# Patient Record
Sex: MALE | Race: WHITE | NOT HISPANIC OR LATINO | Employment: UNEMPLOYED | URBAN - METROPOLITAN AREA
[De-identification: names, ages, dates, MRNs, and addresses within clinical notes are randomized per-mention and may not be internally consistent; named-entity substitution may affect disease eponyms.]

---

## 2018-04-13 ENCOUNTER — TRANSCRIBE ORDERS (OUTPATIENT)
Dept: LAB | Facility: HOSPITAL | Age: 23
End: 2018-04-13

## 2018-04-13 ENCOUNTER — LAB (OUTPATIENT)
Dept: LAB | Facility: HOSPITAL | Age: 23
End: 2018-04-13
Attending: ORTHOPAEDIC SURGERY

## 2018-04-13 DIAGNOSIS — M25.373 ANKLE INSTABILITY, UNSPECIFIED LATERALITY: Primary | ICD-10-CM

## 2018-04-13 DIAGNOSIS — M25.373 ANKLE INSTABILITY, UNSPECIFIED LATERALITY: ICD-10-CM

## 2018-04-13 LAB
ALBUMIN SERPL-MCNC: 4.5 G/DL (ref 3.5–5.2)
ALBUMIN/GLOB SERPL: 1.8 G/DL
ALP SERPL-CCNC: 44 U/L (ref 39–117)
ALT SERPL W P-5'-P-CCNC: 21 U/L (ref 1–41)
ANION GAP SERPL CALCULATED.3IONS-SCNC: 11.4 MMOL/L
AST SERPL-CCNC: 17 U/L (ref 1–40)
BASOPHILS # BLD AUTO: 0.01 10*3/MM3 (ref 0–0.2)
BASOPHILS NFR BLD AUTO: 0.2 % (ref 0–1.5)
BILIRUB SERPL-MCNC: 0.4 MG/DL (ref 0.1–1.2)
BUN BLD-MCNC: 22 MG/DL (ref 6–20)
BUN/CREAT SERPL: 21.4 (ref 7–25)
CALCIUM SPEC-SCNC: 9.4 MG/DL (ref 8.6–10.5)
CHLORIDE SERPL-SCNC: 105 MMOL/L (ref 98–107)
CO2 SERPL-SCNC: 26.6 MMOL/L (ref 22–29)
CREAT BLD-MCNC: 1.03 MG/DL (ref 0.76–1.27)
DEPRECATED RDW RBC AUTO: 41.3 FL (ref 37–54)
EOSINOPHIL # BLD AUTO: 0.1 10*3/MM3 (ref 0–0.7)
EOSINOPHIL NFR BLD AUTO: 2 % (ref 0.3–6.2)
ERYTHROCYTE [DISTWIDTH] IN BLOOD BY AUTOMATED COUNT: 12.1 % (ref 11.5–14.5)
GFR SERPL CREATININE-BSD FRML MDRD: 89 ML/MIN/1.73
GLOBULIN UR ELPH-MCNC: 2.5 GM/DL
GLUCOSE BLD-MCNC: 108 MG/DL (ref 65–99)
HCT VFR BLD AUTO: 45.7 % (ref 40.4–52.2)
HGB BLD-MCNC: 15.6 G/DL (ref 13.7–17.6)
IMM GRANULOCYTES # BLD: 0 10*3/MM3 (ref 0–0.03)
IMM GRANULOCYTES NFR BLD: 0 % (ref 0–0.5)
LYMPHOCYTES # BLD AUTO: 1.46 10*3/MM3 (ref 0.9–4.8)
LYMPHOCYTES NFR BLD AUTO: 29.6 % (ref 19.6–45.3)
MCH RBC QN AUTO: 32.2 PG (ref 27–32.7)
MCHC RBC AUTO-ENTMCNC: 34.1 G/DL (ref 32.6–36.4)
MCV RBC AUTO: 94.4 FL (ref 79.8–96.2)
MONOCYTES # BLD AUTO: 0.55 10*3/MM3 (ref 0.2–1.2)
MONOCYTES NFR BLD AUTO: 11.2 % (ref 5–12)
NEUTROPHILS # BLD AUTO: 2.81 10*3/MM3 (ref 1.9–8.1)
NEUTROPHILS NFR BLD AUTO: 57 % (ref 42.7–76)
PLATELET # BLD AUTO: 198 10*3/MM3 (ref 140–500)
PMV BLD AUTO: 12 FL (ref 6–12)
POTASSIUM BLD-SCNC: 4.3 MMOL/L (ref 3.5–5.2)
PROT SERPL-MCNC: 7 G/DL (ref 6–8.5)
RBC # BLD AUTO: 4.84 10*6/MM3 (ref 4.6–6)
SODIUM BLD-SCNC: 143 MMOL/L (ref 136–145)
WBC NRBC COR # BLD: 4.93 10*3/MM3 (ref 4.5–10.7)

## 2018-04-13 PROCEDURE — 80053 COMPREHEN METABOLIC PANEL: CPT

## 2018-04-13 PROCEDURE — 36415 COLL VENOUS BLD VENIPUNCTURE: CPT

## 2018-04-13 PROCEDURE — 85025 COMPLETE CBC W/AUTO DIFF WBC: CPT

## 2020-01-06 ENCOUNTER — OFFICE VISIT (OUTPATIENT)
Dept: INTERNAL MEDICINE | Facility: CLINIC | Age: 25
End: 2020-01-06

## 2020-01-06 VITALS
HEART RATE: 86 BPM | TEMPERATURE: 98 F | OXYGEN SATURATION: 97 % | RESPIRATION RATE: 16 BRPM | DIASTOLIC BLOOD PRESSURE: 72 MMHG | WEIGHT: 192 LBS | SYSTOLIC BLOOD PRESSURE: 127 MMHG | HEIGHT: 73 IN | BODY MASS INDEX: 25.45 KG/M2

## 2020-01-06 DIAGNOSIS — Z00.00 ANNUAL PHYSICAL EXAM: Primary | ICD-10-CM

## 2020-01-06 DIAGNOSIS — Z20.2 POSSIBLE EXPOSURE TO STD: ICD-10-CM

## 2020-01-06 PROCEDURE — 99385 PREV VISIT NEW AGE 18-39: CPT | Performed by: NURSE PRACTITIONER

## 2020-01-06 NOTE — PROGRESS NOTES
Subjective   Landon Ambrose is a 24 y.o. male. Patient is here today for   Chief Complaint   Patient presents with   • Annual Exam   • Establish Care          Vitals:    01/06/20 0805   BP: 127/72   Pulse: 86   Resp: 16   Temp: 98 °F (36.7 °C)   SpO2: 97%     Body mass index is 25.33 kg/m².    The following portions of the patient's history were reviewed and updated as appropriate: allergies, current medications, past family history, past medical history, past social history, past surgical history and problem list.    History reviewed. No pertinent past medical history.   Allergies   Allergen Reactions   • Codeine Anaphylaxis     Stomach pain      Social History     Socioeconomic History   • Marital status: Single     Spouse name: Not on file   • Number of children: Not on file   • Years of education: Not on file   • Highest education level: Not on file   Tobacco Use   • Smoking status: Never Smoker   • Smokeless tobacco: Never Used   Substance and Sexual Activity   • Alcohol use: Yes     Frequency: 2-4 times a month     Comment: rarely   • Drug use: Yes     Types: Marijuana   • Sexual activity: Yes     Partners: Female     Birth control/protection: None      No current outpatient medications on file.       Objective   History of Present Illness   Landon Ambrose 24 y.o. male who presents for an Annual Wellness Visit and to establish care.  he has a history of There is no problem list on file for this patient.  .  he has been doing well with no interval problems.    Plan to update vaccines if needed today.    Health Habits:  Dental Exam. up to date  Eye Exam. up to date  Exercise: 0 times/week.  Current exercise activities include: works construction      Lab Results (most recent)     None            Review of Systems   Constitutional: Negative for fatigue.   Eyes: Negative for visual disturbance.   Respiratory: Positive for cough (was diagnosed with the flu a few weeks ago, has had a lingering cough , nonproductive  and is slowly improving ). Negative for chest tightness and shortness of breath.    Cardiovascular: Negative.    Gastrointestinal: Negative.    Genitourinary: Negative for discharge, dysuria, frequency, hematuria, penile pain, penile swelling, scrotal swelling and urgency.   Musculoskeletal: Negative.    Skin: Negative for rash.   Neurological: Negative.    Psychiatric/Behavioral: Positive for sleep disturbance. Negative for dysphoric mood. The patient is not nervous/anxious.        Physical Exam   Constitutional: He is oriented to person, place, and time. Vital signs are normal. He appears well-developed and well-nourished. No distress.   HENT:   Head: Normocephalic.   Right Ear: Tympanic membrane and ear canal normal.   Left Ear: Tympanic membrane and ear canal normal.   Nose: Mucosal edema and rhinorrhea (small amount of clear ) present.   Mouth/Throat: Uvula is midline. Posterior oropharyngeal erythema present.   Eyes: Conjunctivae, EOM and lids are normal.   Neck: Trachea normal. Neck supple. No thyromegaly present.   Cardiovascular: Normal rate, regular rhythm and normal heart sounds.   Pulmonary/Chest: Effort normal and breath sounds normal.   Abdominal: Soft. Normal appearance. Hernia confirmed negative in the right inguinal area and confirmed negative in the left inguinal area.   Genitourinary: Testes normal and penis normal. Right testis shows no mass and no tenderness. Left testis shows no mass and no tenderness. Circumcised. No penile tenderness. No discharge found.   Musculoskeletal: Normal range of motion.   Neurological: He is alert and oriented to person, place, and time.   Psychiatric: He has a normal mood and affect.       ASSESSMENT       Problem List Items Addressed This Visit     None      Visit Diagnoses     Annual physical exam    -  Primary    Relevant Orders    Lipid Panel With LDL / HDL Ratio    CBC & Differential    Vitamin D 25 Hydroxy    TSH Rfx On Abnormal To Free T4    Urinalysis With  Culture If Indicated -    Comprehensive Metabolic Panel    Possible exposure to STD        Relevant Orders    Chlamydia trachomatis, Neisseria gonorrhoeae, PCR - Urine, Urine, Clean Catch    HIV-1 / O / 2 Ag / Antibody 4th Generation    RPR          PLAN    Will check labs and std screen and call with results   Discussed dietary changes-  We are out of the flu vaccine so I recommend he go to his pharmacy to get it   Return in about 1 year (around 1/6/2021) for Annual physical. or sooner if needed

## 2020-01-08 LAB
25(OH)D3+25(OH)D2 SERPL-MCNC: 17.2 NG/ML (ref 30–100)
ALBUMIN SERPL-MCNC: 5 G/DL (ref 3.5–5.2)
ALBUMIN/GLOB SERPL: 2.1 G/DL
ALP SERPL-CCNC: 50 U/L (ref 39–117)
ALT SERPL-CCNC: 17 U/L (ref 1–41)
APPEARANCE UR: CLEAR
AST SERPL-CCNC: 19 U/L (ref 1–40)
BACTERIA #/AREA URNS HPF: NORMAL /HPF
BASOPHILS # BLD AUTO: 0.02 10*3/MM3 (ref 0–0.2)
BASOPHILS NFR BLD AUTO: 0.4 % (ref 0–1.5)
BILIRUB SERPL-MCNC: 0.9 MG/DL (ref 0.2–1.2)
BILIRUB UR QL STRIP: NEGATIVE
BUN SERPL-MCNC: 21 MG/DL (ref 6–20)
BUN/CREAT SERPL: 17.8 (ref 7–25)
C TRACH RRNA SPEC QL NAA+PROBE: NEGATIVE
CALCIUM SERPL-MCNC: 10 MG/DL (ref 8.6–10.5)
CHLORIDE SERPL-SCNC: 101 MMOL/L (ref 98–107)
CHOLEST SERPL-MCNC: 140 MG/DL (ref 0–200)
CO2 SERPL-SCNC: 27.7 MMOL/L (ref 22–29)
COLOR UR: YELLOW
CREAT SERPL-MCNC: 1.18 MG/DL (ref 0.76–1.27)
EOSINOPHIL # BLD AUTO: 0.05 10*3/MM3 (ref 0–0.4)
EOSINOPHIL NFR BLD AUTO: 0.9 % (ref 0.3–6.2)
EPI CELLS #/AREA URNS HPF: NORMAL /HPF (ref 0–10)
ERYTHROCYTE [DISTWIDTH] IN BLOOD BY AUTOMATED COUNT: 11.8 % (ref 12.3–15.4)
GLOBULIN SER CALC-MCNC: 2.4 GM/DL
GLUCOSE SERPL-MCNC: 112 MG/DL (ref 65–99)
GLUCOSE UR QL: NEGATIVE
HCT VFR BLD AUTO: 44.9 % (ref 37.5–51)
HDLC SERPL-MCNC: 37 MG/DL (ref 40–60)
HGB BLD-MCNC: 15.9 G/DL (ref 13–17.7)
HGB UR QL STRIP: NEGATIVE
HIV 1+2 AB+HIV1 P24 AG SERPL QL IA: NON REACTIVE
IMM GRANULOCYTES # BLD AUTO: 0.03 10*3/MM3 (ref 0–0.05)
IMM GRANULOCYTES NFR BLD AUTO: 0.6 % (ref 0–0.5)
KETONES UR QL STRIP: NEGATIVE
LDLC SERPL CALC-MCNC: 92 MG/DL (ref 0–100)
LDLC/HDLC SERPL: 2.5 {RATIO}
LEUKOCYTE ESTERASE UR QL STRIP: NEGATIVE
LYMPHOCYTES # BLD AUTO: 1.3 10*3/MM3 (ref 0.7–3.1)
LYMPHOCYTES NFR BLD AUTO: 24.1 % (ref 19.6–45.3)
MCH RBC QN AUTO: 31.1 PG (ref 26.6–33)
MCHC RBC AUTO-ENTMCNC: 35.4 G/DL (ref 31.5–35.7)
MCV RBC AUTO: 87.9 FL (ref 79–97)
MICRO URNS: NORMAL
MICRO URNS: NORMAL
MONOCYTES # BLD AUTO: 0.58 10*3/MM3 (ref 0.1–0.9)
MONOCYTES NFR BLD AUTO: 10.7 % (ref 5–12)
MUCOUS THREADS URNS QL MICRO: PRESENT /HPF
N GONORRHOEA RRNA SPEC QL NAA+PROBE: NEGATIVE
NEUTROPHILS # BLD AUTO: 3.42 10*3/MM3 (ref 1.7–7)
NEUTROPHILS NFR BLD AUTO: 63.3 % (ref 42.7–76)
NITRITE UR QL STRIP: NEGATIVE
NRBC BLD AUTO-RTO: 0 /100 WBC (ref 0–0.2)
PH UR STRIP: 6 [PH] (ref 5–7.5)
PLATELET # BLD AUTO: 261 10*3/MM3 (ref 140–450)
POTASSIUM SERPL-SCNC: 4.5 MMOL/L (ref 3.5–5.2)
PROT SERPL-MCNC: 7.4 G/DL (ref 6–8.5)
PROT UR QL STRIP: NORMAL
RBC # BLD AUTO: 5.11 10*6/MM3 (ref 4.14–5.8)
RBC #/AREA URNS HPF: NORMAL /HPF (ref 0–2)
RPR SER QL: NORMAL
SODIUM SERPL-SCNC: 142 MMOL/L (ref 136–145)
SP GR UR: 1.03 (ref 1–1.03)
TRIGL SERPL-MCNC: 53 MG/DL (ref 0–150)
TSH SERPL DL<=0.005 MIU/L-ACNC: 0.63 UIU/ML (ref 0.27–4.2)
URINALYSIS REFLEX: NORMAL
UROBILINOGEN UR STRIP-MCNC: 1 MG/DL (ref 0.2–1)
VLDLC SERPL CALC-MCNC: 10.6 MG/DL
WBC # BLD AUTO: 5.4 10*3/MM3 (ref 3.4–10.8)
WBC #/AREA URNS HPF: NORMAL /HPF (ref 0–5)

## 2020-01-09 ENCOUNTER — TELEPHONE (OUTPATIENT)
Dept: INTERNAL MEDICINE | Facility: CLINIC | Age: 25
End: 2020-01-09

## 2020-01-09 NOTE — TELEPHONE ENCOUNTER
----- Message from QIAN Monterroso sent at 1/9/2020  8:03 AM EST -----  Please call him and let him know that this fasting blood sugar was elevated at 112  Recommend decrease sugar, starch, and carbs,   Cholesterol looked good  Vitamin d was low, recommend otc vitamin d 4,000 iu daily  Std screen was negative

## 2021-03-24 DIAGNOSIS — Z11.59 NEED FOR HEPATITIS C SCREENING TEST: ICD-10-CM

## 2021-03-24 DIAGNOSIS — Z00.00 ROUTINE HEALTH MAINTENANCE: Primary | ICD-10-CM

## 2021-03-25 ENCOUNTER — OFFICE VISIT (OUTPATIENT)
Dept: INTERNAL MEDICINE | Facility: CLINIC | Age: 26
End: 2021-03-25

## 2021-03-25 ENCOUNTER — TELEPHONE (OUTPATIENT)
Dept: INTERNAL MEDICINE | Facility: CLINIC | Age: 26
End: 2021-03-25

## 2021-03-25 VITALS
RESPIRATION RATE: 18 BRPM | OXYGEN SATURATION: 98 % | DIASTOLIC BLOOD PRESSURE: 78 MMHG | HEIGHT: 73 IN | TEMPERATURE: 97.3 F | SYSTOLIC BLOOD PRESSURE: 138 MMHG | HEART RATE: 87 BPM | WEIGHT: 202 LBS | BODY MASS INDEX: 26.77 KG/M2

## 2021-03-25 DIAGNOSIS — R35.0 URINARY FREQUENCY: ICD-10-CM

## 2021-03-25 DIAGNOSIS — Z11.3 SCREENING EXAMINATION FOR STD (SEXUALLY TRANSMITTED DISEASE): ICD-10-CM

## 2021-03-25 DIAGNOSIS — Z00.00 ANNUAL PHYSICAL EXAM: Primary | ICD-10-CM

## 2021-03-25 LAB
ALBUMIN SERPL-MCNC: 4.9 G/DL (ref 3.5–5.2)
ALBUMIN/GLOB SERPL: 2.1 G/DL
ALP SERPL-CCNC: 48 U/L (ref 39–117)
ALT SERPL-CCNC: 21 U/L (ref 1–41)
APPEARANCE UR: CLEAR
AST SERPL-CCNC: 16 U/L (ref 1–40)
BACTERIA #/AREA URNS HPF: NORMAL /HPF
BASOPHILS # BLD AUTO: 0.03 10*3/MM3 (ref 0–0.2)
BASOPHILS NFR BLD AUTO: 0.6 % (ref 0–1.5)
BILIRUB SERPL-MCNC: 0.5 MG/DL (ref 0–1.2)
BILIRUB UR QL STRIP: NEGATIVE
BUN SERPL-MCNC: 21 MG/DL (ref 6–20)
BUN/CREAT SERPL: 18.9 (ref 7–25)
CALCIUM SERPL-MCNC: 9.9 MG/DL (ref 8.6–10.5)
CASTS URNS MICRO: NORMAL
CHLORIDE SERPL-SCNC: 107 MMOL/L (ref 98–107)
CHOLEST SERPL-MCNC: 145 MG/DL (ref 0–200)
CO2 SERPL-SCNC: 26.4 MMOL/L (ref 22–29)
COLOR UR: YELLOW
CREAT SERPL-MCNC: 1.11 MG/DL (ref 0.76–1.27)
EOSINOPHIL # BLD AUTO: 0.04 10*3/MM3 (ref 0–0.4)
EOSINOPHIL NFR BLD AUTO: 0.8 % (ref 0.3–6.2)
EPI CELLS #/AREA URNS HPF: NORMAL /HPF
ERYTHROCYTE [DISTWIDTH] IN BLOOD BY AUTOMATED COUNT: 12 % (ref 12.3–15.4)
GLOBULIN SER CALC-MCNC: 2.3 GM/DL
GLUCOSE SERPL-MCNC: 108 MG/DL (ref 65–99)
GLUCOSE UR QL: NEGATIVE
HBA1C MFR BLD: 5.2 % (ref 4.8–5.6)
HCT VFR BLD AUTO: 44.8 % (ref 37.5–51)
HCV AB S/CO SERPL IA: <0.1 S/CO RATIO (ref 0–0.9)
HCV AB SERPL QL IA: NORMAL
HDLC SERPL-MCNC: 35 MG/DL (ref 40–60)
HGB BLD-MCNC: 16.1 G/DL (ref 13–17.7)
HGB UR QL STRIP: NEGATIVE
IMM GRANULOCYTES # BLD AUTO: 0.02 10*3/MM3 (ref 0–0.05)
IMM GRANULOCYTES NFR BLD AUTO: 0.4 % (ref 0–0.5)
KETONES UR QL STRIP: NEGATIVE
LDLC SERPL CALC-MCNC: 98 MG/DL (ref 0–100)
LDLC/HDLC SERPL: 2.81 {RATIO}
LEUKOCYTE ESTERASE UR QL STRIP: NEGATIVE
LYMPHOCYTES # BLD AUTO: 1.36 10*3/MM3 (ref 0.7–3.1)
LYMPHOCYTES NFR BLD AUTO: 26 % (ref 19.6–45.3)
Lab: NORMAL
MCH RBC QN AUTO: 32.4 PG (ref 26.6–33)
MCHC RBC AUTO-ENTMCNC: 35.9 G/DL (ref 31.5–35.7)
MCV RBC AUTO: 90.1 FL (ref 79–97)
MONOCYTES # BLD AUTO: 0.37 10*3/MM3 (ref 0.1–0.9)
MONOCYTES NFR BLD AUTO: 7.1 % (ref 5–12)
NEUTROPHILS # BLD AUTO: 3.41 10*3/MM3 (ref 1.7–7)
NEUTROPHILS NFR BLD AUTO: 65.1 % (ref 42.7–76)
NITRITE UR QL STRIP: NEGATIVE
NRBC BLD AUTO-RTO: 0 /100 WBC (ref 0–0.2)
PH UR STRIP: 5.5 [PH] (ref 5–8)
PLATELET # BLD AUTO: 203 10*3/MM3 (ref 140–450)
POTASSIUM SERPL-SCNC: 4.5 MMOL/L (ref 3.5–5.2)
PROT SERPL-MCNC: 7.2 G/DL (ref 6–8.5)
PROT UR QL STRIP: NEGATIVE
RBC # BLD AUTO: 4.97 10*6/MM3 (ref 4.14–5.8)
RBC #/AREA URNS HPF: NORMAL /HPF
SODIUM SERPL-SCNC: 142 MMOL/L (ref 136–145)
SP GR UR: 1.03 (ref 1–1.03)
TRIGL SERPL-MCNC: 58 MG/DL (ref 0–150)
TSH SERPL DL<=0.005 MIU/L-ACNC: 0.88 UIU/ML (ref 0.27–4.2)
UROBILINOGEN UR STRIP-MCNC: NORMAL MG/DL
VLDLC SERPL CALC-MCNC: 12 MG/DL (ref 5–40)
WBC # BLD AUTO: 5.23 10*3/MM3 (ref 3.4–10.8)
WBC #/AREA URNS HPF: NORMAL /HPF
WRITTEN AUTHORIZATION: NORMAL

## 2021-03-25 PROCEDURE — 99395 PREV VISIT EST AGE 18-39: CPT | Performed by: NURSE PRACTITIONER

## 2021-03-25 NOTE — TELEPHONE ENCOUNTER
----- Message from QIAN Monterroso sent at 3/25/2021 11:18 AM EDT -----  Please notify patient that his glucose is elevated at 108, decrease sugar and starch and please add an A1C on to labs,   HDL (good cholesterol is low) recommend regular exercise

## 2021-03-25 NOTE — PROGRESS NOTES
Subjective   Landon Ambrose is a 25 y.o. male. Patient is here today for   Chief Complaint   Patient presents with   • Annual Exam          Vitals:    03/25/21 0909   BP: 138/78   Pulse: 87   Resp: 18   Temp: 97.3 °F (36.3 °C)   SpO2: 98%     Body mass index is 26.65 kg/m².    The following portions of the patient's history were reviewed and updated as appropriate: allergies, current medications, past family history, past medical history, past social history, past surgical history and problem list.    History reviewed. No pertinent past medical history.   Allergies   Allergen Reactions   • Codeine Anaphylaxis     Stomach pain      Social History     Socioeconomic History   • Marital status: Single     Spouse name: Not on file   • Number of children: Not on file   • Years of education: Not on file   • Highest education level: Not on file   Tobacco Use   • Smoking status: Never Smoker   • Smokeless tobacco: Never Used   Substance and Sexual Activity   • Alcohol use: Yes     Comment: rarely   • Drug use: Yes     Types: Marijuana   • Sexual activity: Yes     Partners: Female     Birth control/protection: None        Current Outpatient Medications:   •  Cimetidine (HEARTBURN RELIEF PO), Take  by mouth., Disp: , Rfl:        Objective   History of Present Illness   Landon Ambrose 25 y.o. male who presents for an Annual Wellness Visit.  he has a history of There is no problem list on file for this patient.  ..  Labs results discussed in detail with the patient.  Plan to update vaccines if needed today.    Health Habits:  Dental Exam. up to date  Eye Exam. does not need   Exercise: 0 times/week.  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   HENT: Negative.    Respiratory: Negative.    Cardiovascular: Negative.     Gastrointestinal: Negative.    Genitourinary: Positive for frequency (one day of increased urination, went 10 times in one day, one episode of incontinence while sleeping , has seen urology ). Negative for difficulty urinating, dysuria, hematuria and urgency.   Musculoskeletal: Negative.    Neurological: Negative.    Psychiatric/Behavioral: Negative for dysphoric mood and sleep disturbance. The patient is nervous/anxious.        Physical Exam  Vitals and nursing note reviewed.   Constitutional:       General: He is not in acute distress.     Appearance: Normal appearance. He is well-developed and well-groomed.   HENT:      Head: Normocephalic.      Right Ear: Tympanic membrane and ear canal normal.      Left Ear: Tympanic membrane and ear canal normal.   Eyes:      General: Lids are normal.      Conjunctiva/sclera: Conjunctivae normal.   Neck:      Thyroid: No thyromegaly.   Cardiovascular:      Rate and Rhythm: Normal rate and regular rhythm.      Heart sounds: Normal heart sounds.   Pulmonary:      Effort: Pulmonary effort is normal.      Breath sounds: Normal breath sounds.   Abdominal:      Palpations: Abdomen is soft.      Tenderness: There is no abdominal tenderness.   Musculoskeletal:      Cervical back: Full passive range of motion without pain.   Skin:     General: Skin is warm and dry.   Neurological:      Mental Status: He is alert and oriented to person, place, and time.   Psychiatric:         Behavior: Behavior is cooperative.         ASSESSMENT       Problems Addressed this Visit     None      Visit Diagnoses     Annual physical exam    -  Primary    Urinary frequency        Relevant Orders    Urine Culture - Urine, Urine, Clean Catch    Chlamydia trachomatis, Neisseria gonorrhoeae, PCR - Urine, Urine, Clean Catch    Screening examination for STD (sexually transmitted disease)        Relevant Orders    HIV-1 / O / 2 Ag / Antibody 4th Generation      Diagnoses       Codes Comments    Annual physical  exam    -  Primary ICD-10-CM: Z00.00  ICD-9-CM: V70.0     Urinary frequency     ICD-10-CM: R35.0  ICD-9-CM: 788.41     Screening examination for STD (sexually transmitted disease)     ICD-10-CM: Z11.3  ICD-9-CM: V74.5           PLAN  Labs still are pending, will call with results  Std test today and will call with results, advised to use condoms   For urinary frequency will check urine culture , advised to follow up with urologist as well  Information given on scheduling covid vaccine-  BMI is normal  Diet is unhealthy, recommend dietary changes and exercise      Return in about 1 year (around 3/25/2022) for Annual physical, with labs. or sooner if needed

## 2021-03-26 ENCOUNTER — TELEPHONE (OUTPATIENT)
Dept: INTERNAL MEDICINE | Facility: CLINIC | Age: 26
End: 2021-03-26

## 2021-03-26 NOTE — TELEPHONE ENCOUNTER
----- Message from QIAN Monterroso sent at 3/26/2021  7:48 AM EDT -----  Please notify patient that fasting glucose was elevated, a1c was normal  Recommend dietary changes as discussed at his appt , follow up in one year

## 2021-03-27 LAB
BACTERIA UR CULT: NO GROWTH
BACTERIA UR CULT: NORMAL
HIV 1+2 AB+HIV1 P24 AG SERPL QL IA: NON REACTIVE

## 2021-04-05 ENCOUNTER — TELEPHONE (OUTPATIENT)
Dept: INTERNAL MEDICINE | Facility: CLINIC | Age: 26
End: 2021-04-05

## 2021-04-05 NOTE — TELEPHONE ENCOUNTER
----- Message from QIAN Monterroso sent at 4/5/2021  9:16 AM EDT -----  Please notify hiv and urine culture are negative

## 2021-04-16 ENCOUNTER — BULK ORDERING (OUTPATIENT)
Dept: CASE MANAGEMENT | Facility: OTHER | Age: 26
End: 2021-04-16

## 2021-04-16 DIAGNOSIS — Z23 IMMUNIZATION DUE: ICD-10-CM

## 2025-05-21 ENCOUNTER — LAB (OUTPATIENT)
Dept: LAB | Facility: HOSPITAL | Age: 30
End: 2025-05-21
Payer: COMMERCIAL

## 2025-05-21 DIAGNOSIS — Z31.440 ENCOUNTER OF MALE FOR TESTING FOR GENETIC DISEASE CARRIER STATUS FOR PROCREATIVE MANAGEMENT: Primary | ICD-10-CM

## 2025-05-21 PROCEDURE — 36415 COLL VENOUS BLD VENIPUNCTURE: CPT
